# Patient Record
Sex: MALE | Race: WHITE | NOT HISPANIC OR LATINO | Employment: PART TIME | URBAN - METROPOLITAN AREA
[De-identification: names, ages, dates, MRNs, and addresses within clinical notes are randomized per-mention and may not be internally consistent; named-entity substitution may affect disease eponyms.]

---
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
## 2019-08-16 ENCOUNTER — ACNE/ROSACEA (OUTPATIENT)
Dept: URBAN - METROPOLITAN AREA CLINIC 32 | Facility: CLINIC | Age: 19
Setting detail: DERMATOLOGY
End: 2019-08-16

## 2019-08-16 DIAGNOSIS — L57.0 ACTINIC KERATOSIS: ICD-10-CM

## 2019-08-16 PROCEDURE — 99213 OFFICE O/P EST LOW 20 MIN: CPT

## 2019-08-16 RX ORDER — ADAPALENE AND BENZOYL PEROXIDE 1; 25 MG/G; MG/G
1 APPLICATION GEL TOPICAL QHS
Qty: 45 | Refills: 5
Start: 2019-08-16

## 2020-03-24 ENCOUNTER — RX ONLY (RX ONLY)
Age: 20
End: 2020-03-24

## 2020-03-24 RX ORDER — ADAPALENE AND BENZOYL PEROXIDE 1; 25 MG/G; MG/G
1 APPLICATION GEL TOPICAL QHS
Qty: 45 | Refills: 0
Start: 2020-03-24

## 2023-12-29 ENCOUNTER — APPOINTMENT (OUTPATIENT)
Dept: URBAN - METROPOLITAN AREA CLINIC 208 | Age: 23
Setting detail: DERMATOLOGY
End: 2023-12-29

## 2023-12-29 DIAGNOSIS — L74.51 PRIMARY FOCAL HYPERHIDROSIS: ICD-10-CM

## 2023-12-29 PROBLEM — L74.513 PRIMARY FOCAL HYPERHIDROSIS, SOLES: Status: ACTIVE | Noted: 2023-12-29

## 2023-12-29 PROCEDURE — OTHER PRESCRIPTION MEDICATION MANAGEMENT: OTHER

## 2023-12-29 PROCEDURE — OTHER COUNSELING: OTHER

## 2023-12-29 PROCEDURE — 99204 OFFICE O/P NEW MOD 45 MIN: CPT

## 2023-12-29 PROCEDURE — OTHER PRESCRIPTION: OTHER

## 2023-12-29 RX ORDER — ALUMINUM CHLORIDE 20 %
SOLUTION, NON-ORAL TOPICAL QHS
Qty: 60 | Refills: 3 | Status: ERX | COMMUNITY
Start: 2023-12-29

## 2023-12-29 ASSESSMENT — LOCATION SIMPLE DESCRIPTION DERM
LOCATION SIMPLE: RIGHT PLANTAR SURFACE
LOCATION SIMPLE: LEFT PLANTAR SURFACE

## 2023-12-29 ASSESSMENT — LOCATION DETAILED DESCRIPTION DERM
LOCATION DETAILED: LEFT MEDIAL PLANTAR MIDFOOT
LOCATION DETAILED: RIGHT MEDIAL PLANTAR MIDFOOT

## 2023-12-29 ASSESSMENT — LOCATION ZONE DERM: LOCATION ZONE: FEET

## 2023-12-29 NOTE — PROCEDURE: PRESCRIPTION MEDICATION MANAGEMENT
Render In Strict Bullet Format?: No
Detail Level: Zone
Samples Given: Qbrexa - Start if no improvement after using Drysol treatment\\nCertain Dri Wipes
Initiate Treatment: Drysol 20% Solution - Apply to areas of excessive sweating on each foot at bedtime.
Plan: To further control excessive sweating, provider recommended OTC Carpe Antiperspirant Foot Lotion & OTC Certain Dri Antiperspirant Wipes\\n\\nQbrexza samples given - pt can call if Drysol not working and he prefers this RX

## 2023-12-29 NOTE — HPI: OTHER
Condition:: Foot sweat and odor
Please Describe Your Condition:: Patient concerned about foot sweat and odor, present for few years, only tried anti-sweat deodorant on the feet 3 years ago but no improvement.